# Patient Record
Sex: MALE | Race: WHITE | NOT HISPANIC OR LATINO | Employment: OTHER | URBAN - METROPOLITAN AREA
[De-identification: names, ages, dates, MRNs, and addresses within clinical notes are randomized per-mention and may not be internally consistent; named-entity substitution may affect disease eponyms.]

---

## 2018-07-07 ENCOUNTER — OFFICE VISIT (OUTPATIENT)
Dept: URGENT CARE | Age: 48
End: 2018-07-07

## 2018-07-07 VITALS
BODY MASS INDEX: 30.61 KG/M2 | SYSTOLIC BLOOD PRESSURE: 144 MMHG | DIASTOLIC BLOOD PRESSURE: 95 MMHG | RESPIRATION RATE: 16 BRPM | TEMPERATURE: 98.9 F | HEART RATE: 85 BPM | WEIGHT: 195 LBS | HEIGHT: 67 IN

## 2018-07-07 DIAGNOSIS — H66.93 BILATERAL OTITIS MEDIA, UNSPECIFIED OTITIS MEDIA TYPE: Primary | ICD-10-CM

## 2018-07-07 PROCEDURE — 99203 OFFICE O/P NEW LOW 30 MIN: CPT | Performed by: PHYSICIAN ASSISTANT

## 2018-07-07 RX ORDER — PREDNISONE 20 MG/1
60 TABLET ORAL DAILY
Qty: 15 TABLET | Refills: 0 | Status: SHIPPED | OUTPATIENT
Start: 2018-07-07 | End: 2018-07-12

## 2018-07-07 RX ORDER — AZITHROMYCIN 250 MG/1
TABLET, FILM COATED ORAL
Qty: 6 TABLET | Refills: 0 | Status: SHIPPED | OUTPATIENT
Start: 2018-07-07 | End: 2018-07-11

## 2018-07-07 NOTE — PROGRESS NOTES
[facility]        NAME: Savannah Player is a 49 y o  male  : 1970    MRN: [id number]  DATE: 2018  TIME: 10:03 AM    Assessment and Plan   Bilateral otitis media, unspecified otitis media type [H66 [id number]]  1  Bilateral otitis media, unspecified otitis media type  azithromycin (ZITHROMAX) 250 mg tablet    predniSONE 20 mg tablet     Patient instructed to continue to monitor symptoms  If symptoms do not improve in 1 week with therapy follow-up with ENT    Patient Instructions       Take medications as directed  Drink plenty of fluids  Follow up with family doctor this week  If symptoms do not improve in 1 week follow-up with ENT  Go to ER immediately if new or worsening symptoms occur  Chief Complaint     Chief Complaint   Patient presents with    Earache     right ear pain and fullness x 3 days  History of Present Illness       Earache    There is pain in the right ear  This is a recurrent problem  Episode onset: 3days ago  The problem occurs constantly  The problem has been gradually worsening  There has been no fever  The pain is at a severity of 6/10  The pain is moderate  Pertinent negatives include no coughing, diarrhea, headaches, neck pain, rash, rhinorrhea, sore throat or vomiting  He has tried nothing for the symptoms  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  Patient states that about a year ago he had a similar infection  Patient states he had to go to ENT for the infection and they put him on steroids and azithromycin  Review of Systems   Review of Systems   Constitutional: Negative for chills, diaphoresis, fatigue and fever  HENT: Positive for ear pain  Negative for congestion, postnasal drip, rhinorrhea, sinus pressure, sore throat and trouble swallowing  Eyes: Negative  Respiratory: Negative for cough, shortness of breath and wheezing  Cardiovascular: Negative      Gastrointestinal: Negative for abdominal distention, diarrhea, nausea and vomiting  Endocrine: Negative  Genitourinary: Negative for dysuria  Musculoskeletal: Negative  Negative for neck pain  Skin: Negative for rash  Allergic/Immunologic: Negative  Neurological: Negative  Negative for light-headedness and headaches  Hematological: Negative  Psychiatric/Behavioral: Negative  Current Medications       Current Outpatient Prescriptions:     azithromycin (ZITHROMAX) 250 mg tablet, Take 2 tablets today then 1 tablet daily x 4 days, Disp: 6 tablet, Rfl: 0    predniSONE 20 mg tablet, Take 3 tablets (60 mg total) by mouth daily for 5 days, Disp: 15 tablet, Rfl: 0    Current Allergies     Allergies as of 07/07/2018 - Reviewed 07/07/2018   Allergen Reaction Noted    Penicillins Anaphylaxis 07/07/2018            The following portions of the patient's history were reviewed and updated as appropriate: allergies, current medications, past family history, past medical history, past social history, past surgical history and problem list      No past medical history on file  No past surgical history on file  No family history on file  Medications have been verified  Objective   /95   Pulse 85   Temp 98 9 °F (37 2 °C)   Resp 16   Ht 5' 7" (1 702 m)   Wt 88 5 kg (195 lb)   BMI 30 54 kg/m²        Physical Exam     Physical Exam   Constitutional: He appears well-developed and well-nourished  No distress  HENT:   Head: Normocephalic and atraumatic  Right Ear: External ear and ear canal normal  No drainage, swelling or tenderness  No foreign bodies  No mastoid tenderness  Tympanic membrane is injected, erythematous and bulging  Tympanic membrane is not perforated  No hemotympanum  Decreased hearing is noted  Left Ear: External ear and ear canal normal  No drainage, swelling or tenderness  No mastoid tenderness  Tympanic membrane is injected and erythematous  Tympanic membrane is not perforated and not bulging  No hemotympanum  No decreased hearing is noted  Eyes: Conjunctivae are normal  Right eye exhibits no discharge  Left eye exhibits no discharge  Neck: Normal range of motion  Neck supple  Cardiovascular: Normal rate, regular rhythm, normal heart sounds and intact distal pulses  Pulmonary/Chest: Effort normal and breath sounds normal  No respiratory distress  He has no wheezes  He has no rales  Lymphadenopathy:     He has no cervical adenopathy  Skin: Skin is warm  No rash noted  He is not diaphoretic  Nursing note and vitals reviewed

## 2018-07-07 NOTE — PATIENT INSTRUCTIONS
Take medications as directed  Drink plenty of fluids  Follow up with family doctor this week  If symptoms do not improve in 1 week follow-up with ENT  Go to ER immediately if new or worsening symptoms occur  Otitis Media   LO QUE NECESITA SABER:   La otitis media es giovani infección en el oído  INSTRUCCIONES SOBRE EL [facility]:   Medicamentos:  · El ibuprofeno o el acetaminofeno  ayuda a disminuir el dolor y la [place]  Están disponibles sin receta médica  Consulte con underwood médico cuál medicamento es el adecuado para usted  Pregunte la cantidad y la frecuencia con que debe tomarlos  Estos medicamentos pueden provocar sangrado estomacal si no se lalito correctamente  El ibuprofeno puede provocar daño al [place]  No tome ibuprofeno si usted tiene enfermedad de los riñones, [place] o si es alérgico a la aspirina  El acetaminofeno puede dañar el hígado  No ingiera alcohol si amy acetaminofeno  · Gotas para los oídos  ayudan a tratar el dolor de oído  · Antibióticos  ayudan a tratar la infección bacterial que provocó la infección de oído  · [place] josefina medicamentos thierry se le haya indicado  Consulte con underwood médico si usted rita que underwood medicamento no le está ayudando o si presenta efectos secundarios  Infórmele si es alérgico a cualquier medicamento  Mantenga giovani lista actualizada de los [place], las vitaminas y los productos herbales que amy  Incluya los siguientes datos de los medicamentos: cantidad, frecuencia y motivo de administración  Traiga con usted la lista o los envases de la píldoras a josefina citas de seguimiento  Lleve la lista de los medicamentos con usted en andrew de giovani emergencia  Calor o hielo:   · El calor  puede usarse para disminuir [place] dolor  Coloque un pañuelo húmedo y tibio en el oído  Aplíquelo por 15 a 20 minutos, de 3 a 4 veces al día  · El hielo  ayuda a disminuir la inflamación y el dolor   Use giovani bolsa con hielo o ponga hielo triturado en giovani bolsa de [place]arda [place]ocha bolsa con giovani toalla y colóquela en el oído por 15 a 20 minutos, de 3 a 4 veces por 2 días  Prevenga la otitis media:   · Lávese las hai frecuentemente  Utilice agua y Carlos Odell  [employer] las hai después de usar el baño, cambiarle el pañal a un rylee o estornudar  Lávese las hai antes de comer o preparar alimentos  · Aléjese de las personas enfermas  Algunos microbios se propagan fácil y rápidamente con el contacto  Regreso a la escuela o al [place]:  Isaak Pore podría regresar al [place] o a la escuela cuando desaparezca la fiebre  Acuda a josefina consultas de control con hilliard médico según le indicaron  Anote josefina preguntas para que se acuerde de hacerlas elinor josefina visitas  Pregúntele a hilliard Chris Gambler vitaminas y minerales son adecuados para usted  · El dolor del oído empeora o no desaparece, incluso después del [place]  · La pare exterior del oído está jose david o inflamada  · Tiene vómitos o diarrea  · Tiene líquido saliendo del oído  · Usted tiene preguntas o inquietudes acerca de hilliard condición o cuidado  Busque atención médica de inmediato o llame al 911 si:   · Usted sufre giovani convulsión  · Tiene fiebre y rigidez en el joanne  © 2017 [address] [employer] Information is for End User's use only and may not be sold, redistributed or otherwise used for commercial purposes  All illustrations and images included in CareNotes® are the copyrighted property of A D A M , Inc  or Kiran Delgado  Esta información es sólo para uso en educación  Hilliard intención no es darle un consejo médico sobre enfermedades o tratamientos  Colsulte con hilliard [place] farmacéutico antes de seguir cualquier régimen médico para saber si es seguro y efectivo para usted